# Patient Record
Sex: FEMALE | Race: WHITE
[De-identification: names, ages, dates, MRNs, and addresses within clinical notes are randomized per-mention and may not be internally consistent; named-entity substitution may affect disease eponyms.]

---

## 2023-11-11 ENCOUNTER — RX ONLY (RX ONLY)
Age: 73
End: 2023-11-11

## 2023-11-11 ENCOUNTER — OFFICE (OUTPATIENT)
Dept: URBAN - METROPOLITAN AREA CLINIC 1 | Facility: CLINIC | Age: 73
Setting detail: OPHTHALMOLOGY
End: 2023-11-11
Payer: MEDICARE

## 2023-11-11 DIAGNOSIS — H35.372: ICD-10-CM

## 2023-11-11 DIAGNOSIS — H01.002: ICD-10-CM

## 2023-11-11 DIAGNOSIS — Z96.1: ICD-10-CM

## 2023-11-11 DIAGNOSIS — H01.004: ICD-10-CM

## 2023-11-11 DIAGNOSIS — H01.005: ICD-10-CM

## 2023-11-11 DIAGNOSIS — H01.001: ICD-10-CM

## 2023-11-11 PROCEDURE — 92134 CPTRZ OPH DX IMG PST SGM RTA: CPT | Performed by: OPHTHALMOLOGY

## 2023-11-11 PROCEDURE — 92202 OPSCPY EXTND ON/MAC DRAW: CPT | Performed by: OPHTHALMOLOGY

## 2023-11-11 PROCEDURE — 92004 COMPRE OPH EXAM NEW PT 1/>: CPT | Performed by: OPHTHALMOLOGY

## 2023-11-11 ASSESSMENT — LID EXAM ASSESSMENTS
OD_BLEPHARITIS: RLL RUL
OS_BLEPHARITIS: LLL LUL

## 2023-11-11 ASSESSMENT — REFRACTION_AUTOREFRACTION
OS_CYLINDER: -1.00
OD_CYLINDER: -1.00
OD_SPHERE: +0.25
OD_AXIS: 104
OS_SPHERE: +0.25
OS_AXIS: 093

## 2023-11-11 ASSESSMENT — CONFRONTATIONAL VISUAL FIELD TEST (CVF)
OD_FINDINGS: FULL
OS_FINDINGS: FULL

## 2023-11-11 ASSESSMENT — SPHEQUIV_DERIVED
OD_SPHEQUIV: -0.25
OS_SPHEQUIV: -0.25

## 2023-12-27 ENCOUNTER — APPOINTMENT (OUTPATIENT)
Dept: ORTHOPEDIC SURGERY | Facility: CLINIC | Age: 73
End: 2023-12-27

## 2024-01-05 ENCOUNTER — APPOINTMENT (OUTPATIENT)
Dept: ORTHOPEDIC SURGERY | Facility: CLINIC | Age: 74
End: 2024-01-05
Payer: MEDICARE

## 2024-01-05 VITALS — HEIGHT: 65 IN | WEIGHT: 178 LBS | BODY MASS INDEX: 29.66 KG/M2

## 2024-01-05 DIAGNOSIS — M51.36 OTHER INTERVERTEBRAL DISC DEGENERATION, LUMBAR REGION: ICD-10-CM

## 2024-01-05 DIAGNOSIS — Z98.1 OTHER INTERVERTEBRAL DISC DEGENERATION, LUMBAR REGION: ICD-10-CM

## 2024-01-05 DIAGNOSIS — I10 ESSENTIAL (PRIMARY) HYPERTENSION: ICD-10-CM

## 2024-01-05 PROCEDURE — 72110 X-RAY EXAM L-2 SPINE 4/>VWS: CPT

## 2024-01-05 PROCEDURE — 99203 OFFICE O/P NEW LOW 30 MIN: CPT

## 2024-01-05 RX ORDER — LEVOTHYROXINE SODIUM 0.11 MG/1
112 TABLET ORAL
Refills: 0 | Status: ACTIVE | COMMUNITY

## 2024-01-05 RX ORDER — AMLODIPINE BESYLATE 2.5 MG/1
2.5 TABLET ORAL
Refills: 0 | Status: ACTIVE | COMMUNITY

## 2024-01-05 RX ORDER — ACETAMINOPHEN 500 MG/1
500 TABLET, COATED ORAL
Refills: 0 | Status: ACTIVE | COMMUNITY

## 2024-01-05 RX ORDER — NAPROXEN SODIUM 220 MG
220 TABLET ORAL
Refills: 0 | Status: ACTIVE | COMMUNITY

## 2024-01-09 ENCOUNTER — APPOINTMENT (OUTPATIENT)
Dept: MRI IMAGING | Facility: CLINIC | Age: 74
End: 2024-01-09

## 2024-01-09 ENCOUNTER — APPOINTMENT (OUTPATIENT)
Dept: ORTHOPEDIC SURGERY | Facility: CLINIC | Age: 74
End: 2024-01-09
Payer: MEDICARE

## 2024-01-09 VITALS — WEIGHT: 178 LBS | BODY MASS INDEX: 29.66 KG/M2 | HEIGHT: 65 IN

## 2024-01-09 DIAGNOSIS — Z85.42 PERSONAL HISTORY OF MALIGNANT NEOPLASM OF OTHER PARTS OF UTERUS: ICD-10-CM

## 2024-01-09 DIAGNOSIS — Z86.39 PERSONAL HISTORY OF OTHER ENDOCRINE, NUTRITIONAL AND METABOLIC DISEASE: ICD-10-CM

## 2024-01-09 DIAGNOSIS — Z87.891 PERSONAL HISTORY OF NICOTINE DEPENDENCE: ICD-10-CM

## 2024-01-09 PROCEDURE — 99214 OFFICE O/P EST MOD 30 MIN: CPT

## 2024-01-09 PROCEDURE — 73564 X-RAY EXAM KNEE 4 OR MORE: CPT | Mod: 50

## 2024-01-10 ENCOUNTER — RESULT REVIEW (OUTPATIENT)
Age: 74
End: 2024-01-10

## 2024-01-10 RX ORDER — HYALURONATE SODIUM 20 MG/2 ML
20 SYRINGE (ML) INTRAARTICULAR
Qty: 6 | Refills: 0 | Status: COMPLETED | COMMUNITY
Start: 2024-01-10 | End: 2024-01-31

## 2024-01-11 NOTE — PHYSICAL EXAM
[Normal Coordination] : normal coordination [Normal DTR UE/LE] : normal DTR UE/LE  [Normal Sensation] : normal sensation [Normal Mood and Affect] : normal mood and affect [Orientated] : orientated [Normal Skin] : normal skin [No Rash] : no rash [No Ulcers] : no ulcers [No Lesions] : no lesions [No obvious lymphadenopathy in areas examined] : no obvious lymphadenopathy in areas examined [NL (90)] : forward flexion 90 degrees [NL (30)] : right lateral rotation 30 degrees [NL (45)] : extension 45 degrees [NL (40)] : right lateral bending 40 degrees [Extension] : extension [Bending to left] : bending to left [Bending to right] : bending to right [5___] : right extensor hallicus longus 5[unfilled]/5 [] : non-antalgic [FreeTextEntry3] : Well healed lumbar incision

## 2024-01-11 NOTE — HISTORY OF PRESENT ILLNESS
[3] : 3 [0] : 0 [Localized] : localized [Sharp] : sharp [Tightness] : tightness [Rest] : rest [Ice] : ice [Walking] : walking [Retired] : Work status: retired [de-identified] : 01/05/2024: Patient presents for initial evaluation. 3 years ago in [place] she had spinal fusion due to extreme left sided sciatica. She had excellent resolution of her preoperative symptoms. She reports daily muscle spasm in the left leg. Recently she has had a return in lower back pain. She takes Aleve and Tylenol with some relief.   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

## 2024-01-11 NOTE — ASSESSMENT
[FreeTextEntry1] : 73-year-old female with prior L4-5 PSF/TLIF and sacroiliitis. She will begin a course of lumbar PT. Medial massage may be beneficial for her. The patient is prescribed Meloxicam. If no improvement, a lumbar MRI will be obtained to evaluate for adjacent segment stenosis. Follow up 1 month.   Omid LEBLANC, attest that this documentation has been prepared under the direction and in the presence of provider Efra Solares MD.  Prior to appointment and during encounter with patient extensive medical records were reviewed including but not limited to, hospital records, out patient records, imaging results, and lab data. During this appointment the patient was examined, diagnoses were discussed and explained in a face to face manner. In addition extensive time was spent reviewing aforementioned diagnostic studies. Counseling including abnormal image results, differential diagnoses, treatment options, risk and benefits, lifestyle changes, current condition, and current medications was performed. Patient's comments, questions, and concerns were address and patient verbalized understanding. Based on this patient's presentation at our office, which is an orthopedic spine surgeon's office, this patient inherently / intrinsically has a risk, however minute, of developing issues such as Cauda equina syndrome, bowel and bladder changes, or progression of motor or neurological deficits such as paralysis which may be permanent.

## 2024-01-11 NOTE — DATA REVIEWED
[FreeTextEntry1] : [facility] RT SEBASTIEN L4-5 PSF/TLIF with all hardware in good position without signs of loosening L5-s1 advanced degene L3-4 retolisthesis with moderate to advanced degeneration Multilevel DDD

## 2024-01-19 ENCOUNTER — OFFICE (OUTPATIENT)
Dept: URBAN - METROPOLITAN AREA CLINIC 88 | Facility: CLINIC | Age: 74
Setting detail: OPHTHALMOLOGY
End: 2024-01-19
Payer: MEDICARE

## 2024-01-19 DIAGNOSIS — H35.372: ICD-10-CM

## 2024-01-19 DIAGNOSIS — H35.371: ICD-10-CM

## 2024-01-19 PROCEDURE — 99213 OFFICE O/P EST LOW 20 MIN: CPT | Performed by: OPHTHALMOLOGY

## 2024-01-19 PROCEDURE — 92134 CPTRZ OPH DX IMG PST SGM RTA: CPT | Performed by: OPHTHALMOLOGY

## 2024-01-19 ASSESSMENT — REFRACTION_AUTOREFRACTION
OS_CYLINDER: -1.00
OS_SPHERE: +0.25
OD_AXIS: 104
OD_CYLINDER: -1.00
OD_SPHERE: +0.25
OS_AXIS: 093

## 2024-01-19 ASSESSMENT — CONFRONTATIONAL VISUAL FIELD TEST (CVF)
OD_FINDINGS: FULL
OS_FINDINGS: FULL

## 2024-01-19 ASSESSMENT — SPHEQUIV_DERIVED
OD_SPHEQUIV: -0.25
OS_SPHEQUIV: -0.25

## 2024-01-19 ASSESSMENT — LID EXAM ASSESSMENTS
OS_BLEPHARITIS: LLL LUL
OD_BLEPHARITIS: RLL RUL

## 2024-02-06 ENCOUNTER — APPOINTMENT (OUTPATIENT)
Dept: ORTHOPEDIC SURGERY | Facility: CLINIC | Age: 74
End: 2024-02-06
Payer: MEDICARE

## 2024-02-06 VITALS — WEIGHT: 178 LBS | HEIGHT: 65 IN | BODY MASS INDEX: 29.66 KG/M2

## 2024-02-06 DIAGNOSIS — Z78.9 OTHER SPECIFIED HEALTH STATUS: ICD-10-CM

## 2024-02-06 PROCEDURE — 99212 OFFICE O/P EST SF 10 MIN: CPT | Mod: 25

## 2024-02-06 PROCEDURE — 20611 DRAIN/INJ JOINT/BURSA W/US: CPT | Mod: 50

## 2024-02-06 NOTE — DISCUSSION/SUMMARY
[de-identified] : General Dx Discussion The patient was advised of the diagnosis. The natural history of the pathology was explained in full to the patient in layman's terms. All questions were answered. The risks and benefits of surgical and non-surgical treatment alternatives were explained in full to the patient.  Case Discussed. Euflexxa tolerated well.  f/u 1 week to continue series.   Entered by Tara RUBIO acting as a scribe. Instructions: Dr. Ag- The documentation recorded by the scribe accurately reflects the service I personally performed and the decisions made by me.

## 2024-02-06 NOTE — PHYSICAL EXAM
[Bilateral] : knee bilaterally [NL (0)] : extension 0 degrees [5___] : hamstring 5[unfilled]/5 [] : no tenderness [TWNoteComboBox7] : flexion 110 degrees

## 2024-02-06 NOTE — HISTORY OF PRESENT ILLNESS
[2] : 2 [Localized] : localized [de-identified] : Here for f/u knees for Euflexxa injections.  [] : no [FreeTextEntry1] : knees [FreeTextEntry9] : HEP HEP [de-identified] : coming down the stairs

## 2024-02-07 ENCOUNTER — APPOINTMENT (OUTPATIENT)
Dept: ORTHOPEDIC SURGERY | Facility: CLINIC | Age: 74
End: 2024-02-07

## 2024-02-13 ENCOUNTER — APPOINTMENT (OUTPATIENT)
Dept: ORTHOPEDIC SURGERY | Facility: CLINIC | Age: 74
End: 2024-02-13

## 2024-02-20 ENCOUNTER — APPOINTMENT (OUTPATIENT)
Dept: ORTHOPEDIC SURGERY | Facility: CLINIC | Age: 74
End: 2024-02-20
Payer: MEDICARE

## 2024-02-20 VITALS — BODY MASS INDEX: 29.66 KG/M2 | HEIGHT: 65 IN | WEIGHT: 178 LBS

## 2024-02-20 PROCEDURE — 20611 DRAIN/INJ JOINT/BURSA W/US: CPT | Mod: 50

## 2024-02-20 NOTE — DISCUSSION/SUMMARY
[de-identified] : General Dx Discussion The patient was advised of the diagnosis. The natural history of the pathology was explained in full to the patient in layman's terms. All questions were answered. The risks and benefits of surgical and non-surgical treatment alternatives were explained in full to the patient.  Case Discussed. Euflexxa tolerated well.  f/u 1 week to continue series.   Entered by Kelsey RUBIO acting as a scribe. Instructions: Dr. Ag- The documentation recorded by the scribe accurately reflects the service I personally performed and the decisions made by me.

## 2024-02-20 NOTE — PHYSICAL EXAM
[Bilateral] : knee bilaterally [NL (0)] : extension 0 degrees [5___] : hamstring 5[unfilled]/5 [] : patient ambulates without assistive device [TWNoteComboBox7] : flexion 110 degrees

## 2024-02-20 NOTE — HISTORY OF PRESENT ILLNESS
[0] : 0 [Localized] : localized [Injection therapy] : injection therapy [Euflexxa] : Euflexxa [de-identified] : Here for bilateral knee Euflexxa injections. [] : no [FreeTextEntry1] : bilat knees [de-identified] : 2-6-24 [de-identified] : knees

## 2024-02-27 ENCOUNTER — APPOINTMENT (OUTPATIENT)
Dept: ORTHOPEDIC SURGERY | Facility: CLINIC | Age: 74
End: 2024-02-27
Payer: MEDICARE

## 2024-02-27 VITALS — BODY MASS INDEX: 29.66 KG/M2 | HEIGHT: 65 IN | WEIGHT: 178 LBS

## 2024-02-27 DIAGNOSIS — M17.12 UNILATERAL PRIMARY OSTEOARTHRITIS, LEFT KNEE: ICD-10-CM

## 2024-02-27 DIAGNOSIS — M17.11 UNILATERAL PRIMARY OSTEOARTHRITIS, RIGHT KNEE: ICD-10-CM

## 2024-02-27 PROCEDURE — 20611 DRAIN/INJ JOINT/BURSA W/US: CPT | Mod: 50

## 2024-02-27 PROCEDURE — 99024 POSTOP FOLLOW-UP VISIT: CPT

## 2024-02-27 NOTE — PROCEDURE
[Large Joint Injection] : Large joint injection [Bilateral] : bilaterally of the [Pain] : pain [Knee] : knee [Inflammation] : inflammation [X-ray evidence of Osteoarthritis on this or prior visit] : x-ray evidence of Osteoarthritis on this or prior visit [Alcohol] : alcohol [Betadine] : betadine [Ethyl Chloride sprayed topically] : ethyl chloride sprayed topically [Sterile technique used] : sterile technique used [Euflexxa(20mg)] : 20mg of Euflexxa [#3] : series #3 [] : Patient tolerated procedure well [Call if redness, pain or fever occur] : call if redness, pain or fever occur [Previous OTC use and PT nontherapeutic] : patient has tried OTC's including aspirin, Ibuprofen, Aleve, etc or prescription NSAIDS, and/or exercises at home and/or physical therapy without satisfactory response [Apply ice for 15min out of every hour for the next 12-24 hours as tolerated] : apply ice for 15 minutes out of every hour for the next 12-24 hours as tolerated [Patient had decreased mobility in the joint] : patient had decreased mobility in the joint [Risks, benefits, alternatives discussed / Verbal consent obtained] : the risks benefits, and alternatives have been discussed, and verbal consent was obtained [Prior failure or difficult injection] : prior failure or difficult injection [Altered anatomic landmarks d/t erosive arthritis] : altered anatomic landmarks d/t erosive arthritis [All ultrasound images have been permanently captured and stored accordingly in our picture archiving and communication system] : All ultrasound images have been permanently captured and stored accordingly in our picture archiving and communication system

## 2024-02-27 NOTE — HISTORY OF PRESENT ILLNESS
[2] : 2 [Localized] : localized [Nothing helps with pain getting better] : Nothing helps with pain getting better [3] : 3 [Euflexxa] : Euflexxa [de-identified] : Here for bilateral knee Euflexxa injections. [] : no [FreeTextEntry1] : knees [de-identified] : knees [de-identified] : 2-20-24

## 2024-02-27 NOTE — PHYSICAL EXAM
[Bilateral] : knee bilaterally [NL (0)] : extension 0 degrees [5___] : hamstring 5[unfilled]/5 [] : no erythema [TWNoteComboBox7] : flexion 110 degrees

## 2024-03-10 ENCOUNTER — RX RENEWAL (OUTPATIENT)
Age: 74
End: 2024-03-10

## 2024-03-26 ENCOUNTER — OFFICE (OUTPATIENT)
Dept: URBAN - METROPOLITAN AREA CLINIC 1 | Facility: CLINIC | Age: 74
Setting detail: OPHTHALMOLOGY
End: 2024-03-26
Payer: MEDICARE

## 2024-03-26 DIAGNOSIS — H01.002: ICD-10-CM

## 2024-03-26 DIAGNOSIS — Z96.1: ICD-10-CM

## 2024-03-26 DIAGNOSIS — H35.372: ICD-10-CM

## 2024-03-26 DIAGNOSIS — H01.001: ICD-10-CM

## 2024-03-26 DIAGNOSIS — H35.371: ICD-10-CM

## 2024-03-26 DIAGNOSIS — H01.005: ICD-10-CM

## 2024-03-26 DIAGNOSIS — H01.004: ICD-10-CM

## 2024-03-26 DIAGNOSIS — H52.4: ICD-10-CM

## 2024-03-26 PROCEDURE — 92134 CPTRZ OPH DX IMG PST SGM RTA: CPT

## 2024-03-26 PROCEDURE — 99213 OFFICE O/P EST LOW 20 MIN: CPT

## 2024-03-26 PROCEDURE — 92015 DETERMINE REFRACTIVE STATE: CPT

## 2024-03-26 ASSESSMENT — REFRACTION_MANIFEST
OS_SPHERE: PLANO
OS_AXIS: 085
OD_SPHERE: PLANO
OD_AXIS: 105
OS_VA1: 20/20-1
OD_CYLINDER: -0.75
OD_VA1: 20/20-1
OS_ADD: +2.50
OD_ADD: +2.50
OS_CYLINDER: -0.75

## 2024-03-26 ASSESSMENT — REFRACTION_CURRENTRX
OD_SPHERE: +0.25
OS_ADD: +2.50
OD_AXIS: 135
OD_OVR_VA: 20/
OS_SPHERE: -0.25
OD_CYLINDER: -0.50
OS_OVR_VA: 20/
OD_ADD: +2.50
OS_VPRISM_DIRECTION: PROGS
OS_AXIS: 058
OD_VPRISM_DIRECTION: PROGS
OS_CYLINDER: -0.50

## 2024-03-26 ASSESSMENT — LID EXAM ASSESSMENTS
OD_BLEPHARITIS: RLL RUL
OS_BLEPHARITIS: LLL LUL

## 2024-03-29 ENCOUNTER — OFFICE (OUTPATIENT)
Dept: URBAN - METROPOLITAN AREA CLINIC 88 | Facility: CLINIC | Age: 74
Setting detail: OPHTHALMOLOGY
End: 2024-03-29
Payer: MEDICARE

## 2024-03-29 DIAGNOSIS — H35.371: ICD-10-CM

## 2024-03-29 DIAGNOSIS — H35.372: ICD-10-CM

## 2024-03-29 PROBLEM — H52.7 REFRACTIVE ERROR: Status: ACTIVE | Noted: 2024-03-26

## 2024-03-29 PROCEDURE — 92134 CPTRZ OPH DX IMG PST SGM RTA: CPT | Performed by: OPHTHALMOLOGY

## 2024-03-29 PROCEDURE — 99213 OFFICE O/P EST LOW 20 MIN: CPT | Performed by: OPHTHALMOLOGY

## 2024-03-29 ASSESSMENT — LID EXAM ASSESSMENTS
OS_BLEPHARITIS: LLL LUL
OD_BLEPHARITIS: RLL RUL

## 2024-04-02 ENCOUNTER — APPOINTMENT (OUTPATIENT)
Dept: ORTHOPEDIC SURGERY | Facility: CLINIC | Age: 74
End: 2024-04-02

## 2024-04-19 ENCOUNTER — APPOINTMENT (OUTPATIENT)
Dept: ORTHOPEDIC SURGERY | Facility: CLINIC | Age: 74
End: 2024-04-19
Payer: MEDICARE

## 2024-04-19 VITALS — HEIGHT: 65 IN | WEIGHT: 178 LBS | BODY MASS INDEX: 29.66 KG/M2

## 2024-04-19 DIAGNOSIS — Z98.1 ARTHRODESIS STATUS: ICD-10-CM

## 2024-04-19 DIAGNOSIS — M46.1 SACROILIITIS, NOT ELSEWHERE CLASSIFIED: ICD-10-CM

## 2024-04-19 PROCEDURE — 99214 OFFICE O/P EST MOD 30 MIN: CPT

## 2024-04-21 PROBLEM — Z98.1 S/P LUMBAR FUSION: Status: ACTIVE | Noted: 2024-01-05

## 2024-04-21 PROBLEM — M46.1 SACROILIITIS: Status: ACTIVE | Noted: 2024-01-05

## 2024-04-21 NOTE — HISTORY OF PRESENT ILLNESS
[de-identified] : 04/19/2024: Patient presenting today for a FUV. Meloxicam caused GI distress, only treated with a few times. C/o pain in thoracic and lumbar spine. She states mild BL LE pains. Cramping in bl legs. Pain radiating to BL buttock to posterior thigh. She reports improvement in BL toe numbness.  Being seen by Dr. Ag for knee.   01/05/2024: Patient presents for initial evaluation. 3 years ago in [place] she had spinal fusion due to extreme left sided sciatica. She had excellent resolution of her preoperative symptoms. She reports daily muscle spasm in the left leg. Recently she has had a return in lower back pain. She takes Aleve and Tylenol with some relief.   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

## 2024-04-21 NOTE — PHYSICAL EXAM
[] : patient ambulates with assistive device [FreeTextEntry3] : Well healed lumbar incision  [de-identified] : Mild numbness on dorsal aspect on LT foot.  [TWNoteComboBox7] : forward flexion 45 degrees [de-identified] : extension 10 degrees

## 2024-04-21 NOTE — DISCUSSION/SUMMARY
[de-identified] : 73-year-old female with prior L4-5 PSF/TLIF and sacroiliitis. Lumbar MRI reviewed with pt today.  Patient was educated and informed on their condition along with the expected outcomes. I am referring the patient to Dr. Kingsley to discuss L3-4 NADINE. Discussed possible interventional spine injections vs surgical decompression dependent upon her response to conservative treatments.   F/U in 2 months.   Prior to appointment and during encounter with patient extensive medical records were reviewed including but not limited to, hospital records, out patient records, imaging results, and lab data. During this appointment the patient was examined, diagnoses were discussed and explained in a face to face manner. In addition extensive time was spent reviewing aforementioned diagnostic studies. Counseling including abnormal image results, differential diagnoses, treatment options, risk and benefits, lifestyle changes, current condition, and current medications was performed. Patient's comments, questions, and concerns were address and patient verbalized understanding. Based on this patient's presentation at our office, which is an orthopedic spine surgeon's office, this patient inherently / intrinsically has a risk, however minute, of developing issues such as Cauda equina syndrome, bowel and bladder changes, or progression of motor or neurological deficits such as paralysis which may be permanent.   I, Mahi Jauregui, attest that this documentation has been prepared under the direction and in the presence of provider Efra Solares MD.

## 2024-04-21 NOTE — DATA REVIEWED
[FreeTextEntry1] : On my interpretation of these images from [facility] on 1/10/24. I have additionally reviewed the radiologist report. MRI images were reviewed on today's visit.  L5-S1: adv DDD and mod BL lateral recess stenosis, severe rt mod-severe LT FS.  L4-5: s/p PSF TLIF LAMI L3-4: adv disc degeneration, retrolisthesis and severe BL lateral recess and FS.  L2-3: mod DDD with mod left severe RT FS.  L1-2: mild-mod DDD, minimal stenosis.  T12-L1: mild-mod DDD, minimal stenosis.    [facility] RT SEBASTIEN L4-5 PSF/TLIF with all hardware in good position without signs of loosening L5-s1 advanced degene L3-4 retolisthesis with moderate to advanced degeneration Multilevel DDD

## 2024-04-24 RX ORDER — MELOXICAM 15 MG/1
15 TABLET ORAL DAILY
Qty: 30 | Refills: 1 | Status: ACTIVE | COMMUNITY
Start: 2024-01-05 | End: 1900-01-01

## 2024-05-10 ENCOUNTER — OFFICE (OUTPATIENT)
Dept: URBAN - METROPOLITAN AREA CLINIC 88 | Facility: CLINIC | Age: 74
Setting detail: OPHTHALMOLOGY
End: 2024-05-10
Payer: MEDICARE

## 2024-05-10 DIAGNOSIS — H52.7: ICD-10-CM

## 2024-05-10 DIAGNOSIS — H35.371: ICD-10-CM

## 2024-05-10 DIAGNOSIS — H35.372: ICD-10-CM

## 2024-05-10 PROCEDURE — 92134 CPTRZ OPH DX IMG PST SGM RTA: CPT | Performed by: OPHTHALMOLOGY

## 2024-05-10 PROCEDURE — 92202 OPSCPY EXTND ON/MAC DRAW: CPT | Performed by: OPHTHALMOLOGY

## 2024-05-10 PROCEDURE — 92012 INTRM OPH EXAM EST PATIENT: CPT | Performed by: OPHTHALMOLOGY

## 2024-05-10 ASSESSMENT — CONFRONTATIONAL VISUAL FIELD TEST (CVF)
OD_FINDINGS: FULL
OS_FINDINGS: FULL

## 2024-05-10 ASSESSMENT — LID EXAM ASSESSMENTS
OS_BLEPHARITIS: LLL LUL
OD_BLEPHARITIS: RLL RUL

## 2024-05-13 ENCOUNTER — APPOINTMENT (OUTPATIENT)
Dept: PAIN MANAGEMENT | Facility: CLINIC | Age: 74
End: 2024-05-13

## 2024-05-15 ENCOUNTER — OFFICE (OUTPATIENT)
Dept: URBAN - METROPOLITAN AREA CLINIC 1 | Facility: CLINIC | Age: 74
Setting detail: OPHTHALMOLOGY
End: 2024-05-15
Payer: MEDICARE

## 2024-05-15 DIAGNOSIS — H52.7: ICD-10-CM

## 2024-05-15 PROCEDURE — RX/CHECK RX/CHECK

## 2024-05-15 ASSESSMENT — LID EXAM ASSESSMENTS
OD_BLEPHARITIS: RLL RUL
OS_BLEPHARITIS: LLL LUL

## 2024-05-15 ASSESSMENT — CONFRONTATIONAL VISUAL FIELD TEST (CVF)
OD_FINDINGS: FULL
OS_FINDINGS: FULL

## 2024-06-10 ENCOUNTER — APPOINTMENT (OUTPATIENT)
Dept: PAIN MANAGEMENT | Facility: CLINIC | Age: 74
End: 2024-06-10
Payer: MEDICARE

## 2024-06-10 VITALS — BODY MASS INDEX: 30.32 KG/M2 | WEIGHT: 182 LBS | HEIGHT: 65 IN

## 2024-06-10 DIAGNOSIS — M25.512 PAIN IN RIGHT SHOULDER: ICD-10-CM

## 2024-06-10 DIAGNOSIS — M25.511 PAIN IN RIGHT SHOULDER: ICD-10-CM

## 2024-06-10 PROCEDURE — 99203 OFFICE O/P NEW LOW 30 MIN: CPT

## 2024-06-10 NOTE — REASON FOR VISIT
[Initial Consultation] : an initial pain management consultation [FreeTextEntry2] : Bilateral shoulder pain

## 2024-06-10 NOTE — ASSESSMENT
[FreeTextEntry1] : A discussion regarding available pain management treatment options occurred with the patient.  These included interventional, rehabilitative, pharmacological, and alternative modalities. We will proceed with the following:    Interventional treatment options:   - None indicated at present time   - see additional instructions below    Rehabilitative options:   - Would defer to orthopedic surgery pending eval - participation in active HEP was discussed and encouraged as tolerated  Medication based treatment options:   -Continue Tylenol 500-1000 mg up to TID as needed -Poor candidate for oral NSAIDs as per patient given prior hypertensive effect - see additional instructions below    Complementary treatment options:   - Weight management and lifestyle modifications discussed   Additional treatment recommendations as follows:   - Referral provided to orthopedic surgery (Dr. Guido) regarding bilateral shoulder pain - Patient can follow-up on as-needed basis with any complaints regarding her cervical or lumbar spine pain  The documentation recorded by the scribe, in my presence, accurately reflects the service I personally performed and the decisions made by me with my edits as appropriate.   I, Dejuan Chamorro acting as scribe, attest that this documentation has been prepared under the direction and in the presence of Provider Christiano Sharpe DO.

## 2024-06-10 NOTE — PHYSICAL EXAM
[Bilateral] : shoulder bilaterally [de-identified] : Constitutional:   - No acute distress   - Well developed; well nourished    Neurological:   - normal mood and affect   - alert and oriented x 3     Cardiovascular:   - grossly normal   Cervical Spine Exam:   Inspection:   erythema (-)   ecchymosis (-)   rashes (-)    Palpation:                                                    Cervical paraspinal tenderness:         R (-); L(-)  Upper trapezius tenderness:              R (-); L (-)  Rhomboids tenderness:                      R (-); L (-)  Occipital Ridge:                                   R (-); L (-)  Supraspinatus tenderness:                 R (-); L (-)   ROM: WNL; stiffness throughout ROM testing No pain throughout range of motion testing  Strength Testing:              Deltoid                           R (5/5); L (5/5)  Biceps:                          R (5/5); L (5/5)  Triceps:                         R (5/5); L (5/5)  Finger Abductors:         R (5/5); L (5/5)  Grasp:                           R (5/5); L (5/5)   Special Testing:  Spurling Test:                  R (-); L (-)  Facet load test:               R (-); L (-)   Neuro:  SILT throughout right upper extremity  SILT throughout left upper extremity   Reflexes:  Biceps   -           R (2+); L (2+)  Triceps  -           R (2+); L (2+)  Brachioradialis- R (2+); L (2+)     No ankle clonus   [] : ROM is limited secondary to pain [FreeTextEntry9] : Diminished ROM all planes, crepitus throughout ROM Testing

## 2024-06-10 NOTE — HISTORY OF PRESENT ILLNESS
[Neck] : neck [9] : 9 [Dull/Aching] : dull/aching [Constant] : constant [Sleep] : sleep [Rest] : rest [Standing] : standing [Walking] : walking [FreeTextEntry1] : The patient presents for initial evaluation.  Patient was referred by Dr. Solares for previous complaints related to her lumbar spine.  She states at this point this is no longer a concern for her and wishes to address her pain in her bilateral shoulders. The majority of her pain is at the shoulders, and she has minimal neck pain; she denies any radicular pain going past the elbows.  She denies any upper extremity paresthesias, weakness, bladder/bowel dysfunction, or progressive gait abnormalities.  Uses Tylenol as needed for pain.  Subjective weakness: No  Lower extremity paresthesias: No  Bladder/bowel dysfunction: No   Injections: No   Pertinent Surgical History:  1) L4-5 PSF/TLIF  Imagin) MRI Lumbar Spine (1/10/2024) -  Rad    Physician Disclaimer: I have personally reviewed and confirmed all HPI data with the patient.  [] : Patient is currently injured and not playing sports: no [FreeTextEntry7] : shoulders [FreeTextEntry9] : Tylenol [de-identified] : 2020

## 2024-06-24 ENCOUNTER — APPOINTMENT (OUTPATIENT)
Dept: ORTHOPEDIC SURGERY | Facility: CLINIC | Age: 74
End: 2024-06-24
Payer: MEDICARE

## 2024-06-24 VITALS — BODY MASS INDEX: 30.32 KG/M2 | HEIGHT: 65 IN | WEIGHT: 182 LBS

## 2024-06-24 DIAGNOSIS — Z00.00 ENCOUNTER FOR GENERAL ADULT MEDICAL EXAMINATION W/OUT ABNORMAL FINDINGS: ICD-10-CM

## 2024-06-24 DIAGNOSIS — M19.011 PRIMARY OSTEOARTHRITIS, RIGHT SHOULDER: ICD-10-CM

## 2024-06-24 DIAGNOSIS — M19.012 PRIMARY OSTEOARTHRITIS, RIGHT SHOULDER: ICD-10-CM

## 2024-06-24 PROCEDURE — 73030 X-RAY EXAM OF SHOULDER: CPT | Mod: 50

## 2024-06-24 PROCEDURE — 99214 OFFICE O/P EST MOD 30 MIN: CPT

## 2024-06-26 ENCOUNTER — RESULT REVIEW (OUTPATIENT)
Age: 74
End: 2024-06-26

## 2024-06-30 PROBLEM — Z00.00 ENCOUNTER FOR PREVENTIVE HEALTH EXAMINATION: Status: ACTIVE | Noted: 2023-12-28

## 2024-07-08 ENCOUNTER — APPOINTMENT (OUTPATIENT)
Dept: ORTHOPEDIC SURGERY | Facility: CLINIC | Age: 74
End: 2024-07-08
Payer: MEDICARE

## 2024-07-08 DIAGNOSIS — M19.012 PRIMARY OSTEOARTHRITIS, RIGHT SHOULDER: ICD-10-CM

## 2024-07-08 DIAGNOSIS — M19.011 PRIMARY OSTEOARTHRITIS, RIGHT SHOULDER: ICD-10-CM

## 2024-07-08 PROCEDURE — 99214 OFFICE O/P EST MOD 30 MIN: CPT

## 2024-07-24 ENCOUNTER — APPOINTMENT (OUTPATIENT)
Dept: ORTHOPEDIC SURGERY | Facility: CLINIC | Age: 74
End: 2024-07-24
Payer: MEDICARE

## 2024-07-24 VITALS — HEIGHT: 65 IN | BODY MASS INDEX: 30.32 KG/M2 | WEIGHT: 182 LBS

## 2024-07-24 DIAGNOSIS — M46.1 SACROILIITIS, NOT ELSEWHERE CLASSIFIED: ICD-10-CM

## 2024-07-24 DIAGNOSIS — Z98.1 ARTHRODESIS STATUS: ICD-10-CM

## 2024-07-24 PROCEDURE — 72100 X-RAY EXAM L-S SPINE 2/3 VWS: CPT

## 2024-07-24 PROCEDURE — 99214 OFFICE O/P EST MOD 30 MIN: CPT

## 2024-07-24 RX ORDER — METHYLPREDNISOLONE 4 MG/1
4 TABLET ORAL
Qty: 1 | Refills: 0 | Status: ACTIVE | COMMUNITY
Start: 2024-07-24 | End: 1900-01-01

## 2024-07-28 NOTE — PHYSICAL EXAM
[Normal Coordination] : normal coordination [Normal DTR UE/LE] : normal DTR UE/LE  [Normal Sensation] : normal sensation [Normal Mood and Affect] : normal mood and affect [Oriented] : oriented [Normal Skin] : normal skin [No Rash] : no rash [No Ulcers] : no ulcers [No Lesions] : no lesions [No obvious lymphadenopathy in areas examined] : no obvious lymphadenopathy in areas examined [NL (30)] : right lateral rotation 30 degrees [NL (45)] : extension 45 degrees [NL (40)] : right lateral bending 40 degrees [Flexion] : flexion [Extension] : extension [5___] : right extensor hallicus longus 5[unfilled]/5 [] : non-antalgic [FreeTextEntry3] : Well healed lumbar incision  [de-identified] : Mild numbness on dorsal aspect on LT foot.  [TWNoteComboBox7] : forward flexion 45 degrees [de-identified] : extension 10 degrees

## 2024-07-28 NOTE — DISCUSSION/SUMMARY
[de-identified] : 73-year-old female with prior L4-5 PSF/TLIF and sacroiliitis. XRs reviewed & discussed with patient today- RT SEBASTIEN implant, L4-5 PSF/TLIF adv DDD at L5-S1 & L3-4 with retrolisthesis and adv DDD up to lower thoracic spine. Follow up with Dr. Kingsley to discuss L3-4 NADINE. MDP RX'd for symptom control. She may treat with OTC Tylenol if needed. Discussed possible interventional spine injections vs surgical decompression dependent upon her response to conservative treatments.  Patient was provided with a referral for lumbar physical therapy to work on stretching, strengthening and range of motion.   Moving forward I'd like to see as needed.   Prior to appointment and during encounter with patient extensive medical records were reviewed including but not limited to, hospital records, out patient records, imaging results, and lab data. During this appointment the patient was examined, diagnoses were discussed and explained in a face to face manner. In addition extensive time was spent reviewing aforementioned diagnostic studies. Counseling including abnormal image results, differential diagnoses, treatment options, risk and benefits, lifestyle changes, current condition, and current medications was performed. Patient's comments, questions, and concerns were address and patient verbalized understanding. Based on this patient's presentation at our office, which is an orthopedic spine surgeon's office, this patient inherently / intrinsically has a risk, however minute, of developing issues such as Cauda equina syndrome, bowel and bladder changes, or progression of motor or neurological deficits such as paralysis which may be permanent.   I, Mahi Jauregui, attest that this documentation has been prepared under the direction and in the presence of provider Efra Solares MD.

## 2024-07-28 NOTE — DATA REVIEWED
[FreeTextEntry1] : On my interpretations of these images from [facility] house on 07/24/2024: I have independently reviewed and interpreted these images. 2 V lumbar XR- RT SEBASTIEN implant, L4-5 PSF/TLIF adv DDD at L5-S1 & L3-4 with retrolisthesis and adv DDD up to lower thoracic spine.   On my interpretation of these images from [facility] on 1/10/24. I have additionally reviewed the radiologist report. MRI images were reviewed on today's visit.  L5-S1: adv DDD and mod BL lateral recess stenosis, severe rt mod-severe LT FS.  L4-5: s/p PSF TLIF LAMI L3-4: adv disc degeneration, retrolisthesis and severe BL lateral recess and FS.  L2-3: mod DDD with mod left severe RT FS.  L1-2: mild-mod DDD, minimal stenosis.  T12-L1: mild-mod DDD, minimal stenosis.    [facility] RT SEBASTIEN L4-5 PSF/TLIF with all hardware in good position without signs of loosening L5-s1 advanced degene L3-4 retolisthesis with moderate to advanced degeneration Multilevel DDD

## 2024-07-28 NOTE — HISTORY OF PRESENT ILLNESS
[Radiating] : radiating [] : yes [Retired] : Work status: retired [de-identified] : 07/24/2024: Patient presenting today for a FUV. 5 days of LT leg pain, attributed to moving heavy planter. Pt states she felt increased back and LLE pains afterwards. Sleep disturbances due to LLE pains. Similar symptoms to pre-op pains. Treating with OTC Aleve, heat and ice packs. Did not proceed with injection w/ Dr. Cardenas as the pt was not symptomatic at the time. Seen by Dr. Guido and was indicated for shoulder sx, but is treating condition with PT.     04/19/2024: Patient presenting today for a FUV. Meloxicam caused GI distress, only treated with a few times. C/o pain in thoracic and lumbar spine. She states mild BL LE pains. Cramping in bl legs. Pain radiating to BL buttock to posterior thigh. She reports improvement in BL toe numbness.  Being seen by Dr. Ag for knee.   01/05/2024: Patient presents for initial evaluation. 3 years ago in [place] she had spinal fusion due to extreme left sided sciatica. She had excellent resolution of her preoperative symptoms. She reports daily muscle spasm in the left leg. Recently she has had a return in lower back pain. She takes Aleve and Tylenol with some relief.   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

## 2024-07-28 NOTE — DISCUSSION/SUMMARY
[de-identified] : 73-year-old female with prior L4-5 PSF/TLIF and sacroiliitis. XRs reviewed & discussed with patient today- RT SEBASTIEN implant, L4-5 PSF/TLIF adv DDD at L5-S1 & L3-4 with retrolisthesis and adv DDD up to lower thoracic spine. Follow up with Dr. Kingsley to discuss L3-4 NADINE. MDP RX'd for symptom control. She may treat with OTC Tylenol if needed. Discussed possible interventional spine injections vs surgical decompression dependent upon her response to conservative treatments.  Patient was provided with a referral for lumbar physical therapy to work on stretching, strengthening and range of motion.   Moving forward I'd like to see as needed.   Prior to appointment and during encounter with patient extensive medical records were reviewed including but not limited to, hospital records, out patient records, imaging results, and lab data. During this appointment the patient was examined, diagnoses were discussed and explained in a face to face manner. In addition extensive time was spent reviewing aforementioned diagnostic studies. Counseling including abnormal image results, differential diagnoses, treatment options, risk and benefits, lifestyle changes, current condition, and current medications was performed. Patient's comments, questions, and concerns were address and patient verbalized understanding. Based on this patient's presentation at our office, which is an orthopedic spine surgeon's office, this patient inherently / intrinsically has a risk, however minute, of developing issues such as Cauda equina syndrome, bowel and bladder changes, or progression of motor or neurological deficits such as paralysis which may be permanent.   I, Mahi Jauregui, attest that this documentation has been prepared under the direction and in the presence of provider Efra Solares MD.

## 2024-07-28 NOTE — PHYSICAL EXAM
[Normal Coordination] : normal coordination [Normal DTR UE/LE] : normal DTR UE/LE  [Normal Sensation] : normal sensation [Normal Mood and Affect] : normal mood and affect [Oriented] : oriented [Normal Skin] : normal skin [No Rash] : no rash [No Ulcers] : no ulcers [No Lesions] : no lesions [No obvious lymphadenopathy in areas examined] : no obvious lymphadenopathy in areas examined [NL (30)] : right lateral rotation 30 degrees [NL (45)] : extension 45 degrees [NL (40)] : right lateral bending 40 degrees [Flexion] : flexion [Extension] : extension [5___] : right extensor hallicus longus 5[unfilled]/5 [] : non-antalgic [FreeTextEntry3] : Well healed lumbar incision  [de-identified] : Mild numbness on dorsal aspect on LT foot.  [TWNoteComboBox7] : forward flexion 45 degrees [de-identified] : extension 10 degrees

## 2024-07-28 NOTE — DATA REVIEWED
[FreeTextEntry1] : On my interpretations of these images from [facility] on 07/24/2024: I have independently reviewed and interpreted these images. 2 V lumbar XR- RT SEBASTIEN implant, L4-5 PSF/TLIF adv DDD at L5-S1 & L3-4 with retrolisthesis and adv DDD up to lower thoracic spine.   On my interpretation of these images from [facility] on 1/10/24. I have additionally reviewed the radiologist report. MRI images were reviewed on today's visit.  L5-S1: adv DDD and mod BL lateral recess stenosis, severe rt mod-severe LT FS.  L4-5: s/p PSF TLIF LAMI L3-4: adv disc degeneration, retrolisthesis and severe BL lateral recess and FS.  L2-3: mod DDD with mod left severe RT FS.  L1-2: mild-mod DDD, minimal stenosis.  T12-L1: mild-mod DDD, minimal stenosis.    [facility] RT SEBASTIEN L4-5 PSF/TLIF with all hardware in good position without signs of loosening L5-s1 advanced degene L3-4 retolisthesis with moderate to advanced degeneration Multilevel DDD

## 2024-07-28 NOTE — HISTORY OF PRESENT ILLNESS
[Radiating] : radiating [] : yes [Retired] : Work status: retired [de-identified] : 07/24/2024: Patient presenting today for a FUV. 5 days of LT leg pain, attributed to moving heavy planter. Pt states she felt increased back and LLE pains afterwards. Sleep disturbances due to LLE pains. Similar symptoms to pre-op pains. Treating with OTC Aleve, heat and ice packs. Did not proceed with injection w/ Dr. Cardenas as the pt was not symptomatic at the time. Seen by Dr. Guido and was indicated for shoulder sx, but is treating condition with PT.     04/19/2024: Patient presenting today for a FUV. Meloxicam caused GI distress, only treated with a few times. C/o pain in thoracic and lumbar spine. She states mild BL LE pains. Cramping in bl legs. Pain radiating to BL buttock to posterior thigh. She reports improvement in BL toe numbness.  Being seen by Dr. Ag for knee.   01/05/2024: Patient presents for initial evaluation. 3 years ago in [place] she had spinal fusion due to extreme left sided sciatica. She had excellent resolution of her preoperative symptoms. She reports daily muscle spasm in the left leg. Recently she has had a return in lower back pain. She takes Aleve and Tylenol with some relief.   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

## 2024-08-09 ENCOUNTER — RESULT REVIEW (OUTPATIENT)
Age: 74
End: 2024-08-09

## 2024-08-20 ENCOUNTER — APPOINTMENT (OUTPATIENT)
Dept: PAIN MANAGEMENT | Facility: CLINIC | Age: 74
End: 2024-08-20
Payer: MEDICARE

## 2024-08-20 DIAGNOSIS — Z98.1 ARTHRODESIS STATUS: ICD-10-CM

## 2024-08-20 DIAGNOSIS — M25.511 PAIN IN RIGHT SHOULDER: ICD-10-CM

## 2024-08-20 DIAGNOSIS — M51.36 OTHER INTERVERTEBRAL DISC DEGENERATION, LUMBAR REGION: ICD-10-CM

## 2024-08-20 DIAGNOSIS — M25.512 PAIN IN RIGHT SHOULDER: ICD-10-CM

## 2024-08-20 DIAGNOSIS — Z98.1 OTHER INTERVERTEBRAL DISC DEGENERATION, LUMBAR REGION: ICD-10-CM

## 2024-08-20 PROCEDURE — 99213 OFFICE O/P EST LOW 20 MIN: CPT

## 2024-08-20 NOTE — ASSESSMENT
[FreeTextEntry1] : A discussion regarding available pain management treatment options occurred with the patient.  These included interventional, rehabilitative, pharmacological, and alternative modalities. We will proceed with the following:    Interventional treatment options:   - None indicated at present time - Can consider lumbar directed intervention with return of pain - Once again advised patient to discuss treatment options for her bilateral shoulders with orthopedics - see additional instructions below    Rehabilitative options:   - Continue physical therapy for lumbar spine - participation in active HEP was discussed and encouraged as tolerated  Medication based treatment options:   - Continue Tylenol 500-1000 mg up to TID as needed - Poor candidate for oral NSAIDs as per patient given prior hypertensive effect - see additional instructions below    Complementary treatment options:   - Weight management and lifestyle modifications discussed   Additional treatment recommendations as follows: - Follow-up with Dr. Guido as directed regarding her bilateral shoulder pain - Patient can follow-up on as-needed basis with any complaints regarding her cervical or lumbar spine pain

## 2024-08-20 NOTE — HISTORY OF PRESENT ILLNESS
[Sharp] : sharp [Tightness] : tightness [Household chores] : household chores [Lying in bed] : lying in bed [Lower back] : lower back [FreeTextEntry1] : 2024 - Patient presents for follow-up visit.  She was referred once again by Dr. Solares regarding her low back.  She was last seen approximately 2 months ago.  She did not opt to proceed with interventional therapy given her symptoms has largely resolved.  This appears to be the case once again today; she complains of minimal back pain.  Her greatest pain complaint continues to be her bilateral shoulders.  She was indicated for bilateral TSA with Dr. Guido.  She states that she cannot move forward at this time due to family obligations.  2024 - The patient presents for initial evaluation.  Patient was referred by Dr. Solares for previous complaints related to her lumbar spine.  She states at this point this is no longer a concern for her and wishes to address her pain in her bilateral shoulders. The majority of her pain is at the shoulders, and she has minimal neck pain; she denies any radicular pain going past the elbows.  She denies any upper extremity paresthesias, weakness, bladder/bowel dysfunction, or progressive gait abnormalities.  Uses Tylenol as needed for pain.  Injections: No   Pertinent Surgical History:  1) L4-5 PSF/TLIF  Imagin) MRI Lumbar Spine (1/10/2024) - [facility]    Physician Disclaimer: I have personally reviewed and confirmed all HPI data with the patient. [] : Patient is currently injured and not playing sports: no [FreeTextEntry9] : Tylenol [de-identified] : LIFTING  [de-identified] : 2020

## 2024-08-20 NOTE — PHYSICAL EXAM
[de-identified] : Constitutional:   - No acute distress   - Well developed; well nourished    Neurological:   - normal mood and affect   - alert and oriented x 3     Cardiovascular:   - grossly normal

## 2024-09-11 ENCOUNTER — OFFICE (OUTPATIENT)
Dept: URBAN - METROPOLITAN AREA CLINIC 88 | Facility: CLINIC | Age: 74
Setting detail: OPHTHALMOLOGY
End: 2024-09-11
Payer: MEDICARE

## 2024-09-11 DIAGNOSIS — H35.372: ICD-10-CM

## 2024-09-11 DIAGNOSIS — H52.7: ICD-10-CM

## 2024-09-11 PROCEDURE — 92014 COMPRE OPH EXAM EST PT 1/>: CPT | Performed by: OPHTHALMOLOGY

## 2024-09-11 PROCEDURE — 92134 CPTRZ OPH DX IMG PST SGM RTA: CPT | Performed by: OPHTHALMOLOGY

## 2024-09-11 PROCEDURE — 92202 OPSCPY EXTND ON/MAC DRAW: CPT | Performed by: OPHTHALMOLOGY

## 2024-09-11 ASSESSMENT — LID EXAM ASSESSMENTS
OS_BLEPHARITIS: LLL LUL
OD_BLEPHARITIS: RLL RUL

## 2024-09-11 ASSESSMENT — CONFRONTATIONAL VISUAL FIELD TEST (CVF)
OD_FINDINGS: FULL
OS_FINDINGS: FULL

## 2024-09-12 ENCOUNTER — APPOINTMENT (OUTPATIENT)
Dept: ENDOCRINOLOGY | Facility: CLINIC | Age: 74
End: 2024-09-12

## 2024-09-25 ENCOUNTER — OFFICE (OUTPATIENT)
Dept: URBAN - METROPOLITAN AREA CLINIC 12 | Facility: CLINIC | Age: 74
Setting detail: OPHTHALMOLOGY
End: 2024-09-25
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
## 2024-09-25 PROCEDURE — NO SHOW FE NO SHOW FEE: Performed by: STUDENT IN AN ORGANIZED HEALTH CARE EDUCATION/TRAINING PROGRAM

## 2024-10-02 ENCOUNTER — OFFICE (OUTPATIENT)
Dept: URBAN - METROPOLITAN AREA CLINIC 88 | Facility: CLINIC | Age: 74
Setting detail: OPHTHALMOLOGY
End: 2024-10-02
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
## 2024-10-02 PROCEDURE — NO SHOW FE NO SHOW FEE: Performed by: OPHTHALMOLOGY

## 2024-11-04 ENCOUNTER — OFFICE (OUTPATIENT)
Dept: URBAN - METROPOLITAN AREA CLINIC 105 | Facility: CLINIC | Age: 74
Setting detail: OPHTHALMOLOGY
End: 2024-11-04
Payer: MEDICARE

## 2024-11-04 DIAGNOSIS — H16.223: ICD-10-CM

## 2024-11-04 PROCEDURE — 92012 INTRM OPH EXAM EST PATIENT: CPT | Performed by: OPHTHALMOLOGY

## 2024-11-04 ASSESSMENT — KERATOMETRY
OD_K1POWER_DIOPTERS: 39.50
OS_AXISANGLE_DEGREES: 090
OS_K1POWER_DIOPTERS: 39.75
OD_AXISANGLE_DEGREES: 053
OS_K2POWER_DIOPTERS: 39.75
OD_K2POWER_DIOPTERS: 39.75

## 2024-11-04 ASSESSMENT — REFRACTION_AUTOREFRACTION
OD_CYLINDER: -0.25
OD_AXIS: 111
OD_SPHERE: -0.25
OS_SPHERE: PLANO
OS_AXIS: 086
OS_CYLINDER: -0.75

## 2024-11-04 ASSESSMENT — REFRACTION_CURRENTRX
OD_CYLINDER: -0.25
OS_ADD: +2.50
OS_VPRISM_DIRECTION: PROGS
OS_AXIS: 067
OS_CYLINDER: -0.50
OD_VPRISM_DIRECTION: PROGS
OS_OVR_VA: 20/
OD_AXIS: 119
OD_SPHERE: PLANO
OS_SPHERE: -0.25
OD_OVR_VA: 20/
OD_ADD: +2.50

## 2024-11-04 ASSESSMENT — CONFRONTATIONAL VISUAL FIELD TEST (CVF)
OS_FINDINGS: FULL
OD_FINDINGS: FULL

## 2024-11-04 ASSESSMENT — LID EXAM ASSESSMENTS
OD_BLEPHARITIS: RLL RUL
OS_BLEPHARITIS: LLL LUL

## 2024-11-04 ASSESSMENT — VISUAL ACUITY
OS_BCVA: 20/25-
OD_BCVA: 20/30-2

## 2024-11-04 ASSESSMENT — TONOMETRY
OS_IOP_MMHG: 15
OD_IOP_MMHG: 15

## 2024-11-04 ASSESSMENT — SUPERFICIAL PUNCTATE KERATITIS (SPK)
OD_SPK: T
OS_SPK: 2+

## 2024-11-14 NOTE — H&P PST ADULT - HISTORY OF PRESENT ILLNESS
HPI: 74F PMH HTN; High Cholesterol; Hypothyroidism; Thyroidectomy; Hysterectomy; Lumbosacral spinal surgery with complaints of CASSIDY and a heavy sensation on the left side of her when she tries to exert herself. CT calcium score performed 9/3/24 = 264 (LM: 0, , Cx 75; RCA 0). She underwent SPECT Myocardial Perfusion Study in which she exercised for 6 mins achieving 7 METs, chest pain/ dyspnea/ fatigue occurred EKG changes revealed 1.5mm up sloping ST depression (mildly positive) in inferolateral leads during stress only. Myocardial perfusion imaging revealed small to moderate sized, mild to moderate severity, inferior and inferolateral completely reversible defect suggestive of ischemia. EF 65%. Patient presents to [facility] for elective LHC due to abnormal NST and to further evaluate for coronary artery disease.       Symptoms:        Angina (Class): II        Ischemic Symptoms: chest pain, CASSIDY    Heart Failure:        Systolic/Diastolic/Combined: n/a       NYHA Class (within 2 weeks): n/a    Assessment of LVEF (Must be within 6 months):       EF: 60%       Assessed by: TTE        Date: 4/2024    Prior Cardiac Interventions:       PCI's (Date, Stents, Vessels): n/a       CABG (Date, Grafts): n/a    Noninvasive Testing:   Stress Test: Date: 10/30/24       Protocol: Nuclear Medicine SPECT Myocardial Perfusion       Duration of Exercise: 6.0 mins achieving 7.0 METs       Symptoms: dyspnea, fatigue and chest pain       EKG Changes: 1.5mm upsloping ST depression (mildly positive) in inferolateral leads during stress only       Myocardial Imaging: small to moderate sized, mild to moderate severity, inferior and inferolateral completely reversible defect suggestive of ischemia. EF 65%       Risk Assessment (Low, Medium, High):     Echo (Date, Findings): TTE 4/10/2024: LVEF 60%; no RWMA; trace MR; mild TR; normal pericardium.     CT calcium score performed 9/3/24 = 264 (LM: 0, , Cx 75; RCA 0).    Antianginal Therapies:        Beta Blockers:         Calcium Channel Blockers:        Long Acting Nitrates:        Ranexa:     Associated Risk Factors:        Cerebrovascular Disease: N/A       Chronic Lung Disease: N/A       Peripheral Arterial Disease: N/A       Chronic Kidney Disease (if yes, what is GFR): N/A       Uncontrolled Diabetes (if yes, what is HgbA1C or FBS): N/A       Poorly Controlled Hypertension (if yes, what is SBP): N/A       Morbid Obesity (if yes, what is BMI): N/A       History of Recent Ventricular Arrhythmia: N/A       Inability to Ambulate Safely: N/A       Need for Therapeutic Anticoagulation: N/A       Antiplatelet or Contrast Allergy: N/A     HPI: 74F PMH HTN; High Cholesterol; Hypothyroidism;  Thyroid Ca s/p Thyroidectomy (15 years ago); Uterine Ca s/p Hysterectomy (35 years ago); Spinal stenosis s/p Lumbosacral spinal surgery 2020) with complaints of CASSIDY and a heavy sensation on the left side of her when she tries to exert herself. CT calcium score performed 9/3/24 = 264 (LM: 0, , Cx 75; RCA 0). She underwent SPECT Myocardial Perfusion Study in which she exercised for 6 mins achieving 7 METs, chest pain/ dyspnea/ fatigue occurred EKG changes revealed 1.5mm up sloping ST depression (mildly positive) in inferolateral leads during stress only. Myocardial perfusion imaging revealed small to moderate sized, mild to moderate severity, inferior and inferolateral completely reversible defect suggestive of ischemia. EF 65%. Patient presents to [facility] for elective LHC due to abnormal NST and to further evaluate for coronary artery disease.       Symptoms:        Angina (Class): II        Ischemic Symptoms: chest pain, CASSIDY    Heart Failure:        Systolic/Diastolic/Combined: n/a       NYHA Class (within 2 weeks): n/a    Assessment of LVEF (Must be within 6 months):       EF: 60%       Assessed by: TTE        Date: 4/2024    Prior Cardiac Interventions:       PCI's (Date, Stents, Vessels): n/a       CABG (Date, Grafts): n/a    Noninvasive Testing:   Stress Test: Date: 10/30/24       Protocol: Nuclear Medicine SPECT Myocardial Perfusion       Duration of Exercise: 6.0 mins achieving 7.0 METs       Symptoms: dyspnea, fatigue and chest pain       EKG Changes: 1.5mm upsloping ST depression (mildly positive) in inferolateral leads during stress only       Myocardial Imaging: small to moderate sized, mild to moderate severity, inferior and inferolateral completely reversible defect suggestive of ischemia. EF 65%       Risk Assessment (Low, Medium, High):     Echo (Date, Findings): TTE 4/10/2024: LVEF 60%; no RWMA; trace MR; mild TR; normal pericardium.     CT calcium score performed 9/3/24 = 264 (LM: 0, , Cx 75; RCA 0).    Antianginal Therapies:        Beta Blockers:         Calcium Channel Blockers:        Long Acting Nitrates:        Ranexa:     Associated Risk Factors:        Cerebrovascular Disease: N/A       Chronic Lung Disease: N/A       Peripheral Arterial Disease: N/A       Chronic Kidney Disease (if yes, what is GFR): N/A       Uncontrolled Diabetes (if yes, what is HgbA1C or FBS): N/A       Poorly Controlled Hypertension (if yes, what is SBP): N/A       Morbid Obesity (if yes, what is BMI): N/A       History of Recent Ventricular Arrhythmia: N/A       Inability to Ambulate Safely: N/A       Need for Therapeutic Anticoagulation: N/A       Antiplatelet or Contrast Allergy: N/A    LABS:                        14.1   7.11  )-----------( 197      ( 15 Nov 2024 14:35 )             43.7     11-15    142  |  103  |  17.2  ----------------------------<  89  4.7   |  24.0  |  0.63    Ca    9.2      15 Nov 2024 14:35  Mg     2.0     11-15

## 2024-11-14 NOTE — H&P PST ADULT - ASSESSMENT
Impression:  74F PMH HTN; High Cholesterol; Hypothyroidism; Thyroidectomy; Hysterectomy; Lumbosacral spinal surgery, high total calcium score  264 (LM: 0, , Cx 75; RCA 0)  with complaints of CASSIDY and a heavy sensation on the left side of her when she tries to exert herself.+Abnormal SPECT Myocardial Perfusion Study with EKG changes revealed 1.5mm up sloping ST depression in inferolateral leads during stress only. Myocardial perfusion imaging revealed small to moderate sized, mild to moderate severity, inferior and inferolateral completely reversible defect suggestive of ischemia. She presents to [facility] for elective LHC today.     Risk Assessments:  ASA:  Mallampati:  GFR:   Cr:  BRA:    Plan:  -plan for LHC  -preffered access RRA versus RFA  -patient seen and examined  -confirmed appropriate NPO duration  -ECG and Labs reviewed  -Aspirin 81mg po pre-cath  -procedure discussed with patient; risks and benefits explained, questions answered  -consent obtained by attending IC  -0.9% NS 250cc bolus ordered to prevent EVELIA    ASA/Mallempati performed as above.  pt. assessed, appropriate for sedation, pt.  educated regarding the plan for Versed/fentanyl as needed    Risks, benefits, and alternatives reviewed.  Risks including but not limited to MI, death, stroke, bleeding, infection, vessel injury, hematoma, renal failure, allergic reaction, urgent open heart surgery, restenosis and stent thrombosis were reviewed.  All questions answered.  Patient is agreeable to proceed.   Pt. assessed, appropriate for sedation, pt. educated regarding the plan for Versed/Fentanyl as needed.           Impression:  74F PMH HTN; High Cholesterol; Hypothyroidism; Thyroidectomy; Hysterectomy; Lumbosacral spinal surgery, high total calcium score  264 (LM: 0, , Cx 75; RCA 0)  with complaints of CASSIDY and a heavy sensation on the left side of her when she tries to exert herself.+Abnormal SPECT Myocardial Perfusion Study with EKG changes revealed 1.5mm up sloping ST depression in inferolateral leads during stress only. Myocardial perfusion imaging revealed small to moderate sized, mild to moderate severity, inferior and inferolateral completely reversible defect suggestive of ischemia. She presents to [facility] for elective LHC today.     Risk Assessments:  ASA: 3  Mallampati: 2  GFR: 94  Cr: 0.63  BRA: 1.4%    Plan:  -plan for LHC  -preffered access RRA versus RFA  -patient seen and examined  -confirmed appropriate NPO duration  -ECG and Labs reviewed  -Aspirin 81mg po pre-cath  -procedure discussed with patient; risks and benefits explained, questions answered  -consent obtained by attending IC  -0.9% NS 250cc bolus ordered to prevent EVELIA    ASA/Mallempati performed as above.  pt. assessed, appropriate for sedation, pt.  educated regarding the plan for Versed/fentanyl as needed    Risks, benefits, and alternatives reviewed.  Risks including but not limited to MI, death, stroke, bleeding, infection, vessel injury, hematoma, renal failure, allergic reaction, urgent open heart surgery, restenosis and stent thrombosis were reviewed.  All questions answered.  Patient is agreeable to proceed.   Pt. assessed, appropriate for sedation, pt. educated regarding the plan for Versed/Fentanyl as needed.

## 2024-11-15 ENCOUNTER — OUTPATIENT (OUTPATIENT)
Dept: OUTPATIENT SERVICES | Facility: HOSPITAL | Age: 74
LOS: 1 days | End: 2024-11-15
Payer: MEDICARE

## 2024-11-15 ENCOUNTER — TRANSCRIPTION ENCOUNTER (OUTPATIENT)
Age: 74
End: 2024-11-15

## 2024-11-15 VITALS
SYSTOLIC BLOOD PRESSURE: 149 MMHG | TEMPERATURE: 98 F | RESPIRATION RATE: 16 BRPM | DIASTOLIC BLOOD PRESSURE: 73 MMHG | HEART RATE: 68 BPM | OXYGEN SATURATION: 100 %

## 2024-11-15 VITALS — HEART RATE: 76 BPM | DIASTOLIC BLOOD PRESSURE: 68 MMHG | RESPIRATION RATE: 17 BRPM | SYSTOLIC BLOOD PRESSURE: 126 MMHG

## 2024-11-15 DIAGNOSIS — Z98.890 OTHER SPECIFIED POSTPROCEDURAL STATES: Chronic | ICD-10-CM

## 2024-11-15 DIAGNOSIS — E89.0 POSTPROCEDURAL HYPOTHYROIDISM: Chronic | ICD-10-CM

## 2024-11-15 DIAGNOSIS — R94.39 ABNORMAL RESULT OF OTHER CARDIOVASCULAR FUNCTION STUDY: ICD-10-CM

## 2024-11-15 LAB
ABO RH CONFIRMATION: SIGNIFICANT CHANGE UP
ANION GAP SERPL CALC-SCNC: 15 MMOL/L — SIGNIFICANT CHANGE UP (ref 5–17)
BASOPHILS # BLD AUTO: 0.04 K/UL — SIGNIFICANT CHANGE UP (ref 0–0.2)
BASOPHILS NFR BLD AUTO: 0.6 % — SIGNIFICANT CHANGE UP (ref 0–2)
BLD GP AB SCN SERPL QL: SIGNIFICANT CHANGE UP
BUN SERPL-MCNC: 17.2 MG/DL — SIGNIFICANT CHANGE UP (ref 8–20)
CALCIUM SERPL-MCNC: 9.2 MG/DL — SIGNIFICANT CHANGE UP (ref 8.4–10.5)
CHLORIDE SERPL-SCNC: 103 MMOL/L — SIGNIFICANT CHANGE UP (ref 96–108)
CO2 SERPL-SCNC: 24 MMOL/L — SIGNIFICANT CHANGE UP (ref 22–29)
CREAT SERPL-MCNC: 0.63 MG/DL — SIGNIFICANT CHANGE UP (ref 0.5–1.3)
EGFR: 94 ML/MIN/1.73M2 — SIGNIFICANT CHANGE UP
EOSINOPHIL # BLD AUTO: 0.14 K/UL — SIGNIFICANT CHANGE UP (ref 0–0.5)
EOSINOPHIL NFR BLD AUTO: 2 % — SIGNIFICANT CHANGE UP (ref 0–6)
GLUCOSE SERPL-MCNC: 89 MG/DL — SIGNIFICANT CHANGE UP (ref 70–99)
HCT VFR BLD CALC: 43.7 % — SIGNIFICANT CHANGE UP (ref 34.5–45)
HGB BLD-MCNC: 14.1 G/DL — SIGNIFICANT CHANGE UP (ref 11.5–15.5)
IMM GRANULOCYTES NFR BLD AUTO: 0.1 % — SIGNIFICANT CHANGE UP (ref 0–0.9)
LYMPHOCYTES # BLD AUTO: 2.61 K/UL — SIGNIFICANT CHANGE UP (ref 1–3.3)
LYMPHOCYTES # BLD AUTO: 36.7 % — SIGNIFICANT CHANGE UP (ref 13–44)
MAGNESIUM SERPL-MCNC: 2 MG/DL — SIGNIFICANT CHANGE UP (ref 1.6–2.6)
MCHC RBC-ENTMCNC: 28.7 PG — SIGNIFICANT CHANGE UP (ref 27–34)
MCHC RBC-ENTMCNC: 32.3 G/DL — SIGNIFICANT CHANGE UP (ref 32–36)
MCV RBC AUTO: 89 FL — SIGNIFICANT CHANGE UP (ref 80–100)
MONOCYTES # BLD AUTO: 0.35 K/UL — SIGNIFICANT CHANGE UP (ref 0–0.9)
MONOCYTES NFR BLD AUTO: 4.9 % — SIGNIFICANT CHANGE UP (ref 2–14)
NEUTROPHILS # BLD AUTO: 3.96 K/UL — SIGNIFICANT CHANGE UP (ref 1.8–7.4)
NEUTROPHILS NFR BLD AUTO: 55.7 % — SIGNIFICANT CHANGE UP (ref 43–77)
PLATELET # BLD AUTO: 197 K/UL — SIGNIFICANT CHANGE UP (ref 150–400)
POTASSIUM SERPL-MCNC: 4.7 MMOL/L — SIGNIFICANT CHANGE UP (ref 3.5–5.3)
POTASSIUM SERPL-SCNC: 4.7 MMOL/L — SIGNIFICANT CHANGE UP (ref 3.5–5.3)
RBC # BLD: 4.91 M/UL — SIGNIFICANT CHANGE UP (ref 3.8–5.2)
RBC # FLD: 13.5 % — SIGNIFICANT CHANGE UP (ref 10.3–14.5)
SODIUM SERPL-SCNC: 142 MMOL/L — SIGNIFICANT CHANGE UP (ref 135–145)
WBC # BLD: 7.11 K/UL — SIGNIFICANT CHANGE UP (ref 3.8–10.5)
WBC # FLD AUTO: 7.11 K/UL — SIGNIFICANT CHANGE UP (ref 3.8–10.5)

## 2024-11-15 PROCEDURE — 85025 COMPLETE CBC W/AUTO DIFF WBC: CPT

## 2024-11-15 PROCEDURE — 86850 RBC ANTIBODY SCREEN: CPT

## 2024-11-15 PROCEDURE — C1894: CPT

## 2024-11-15 PROCEDURE — C1887: CPT

## 2024-11-15 PROCEDURE — 36415 COLL VENOUS BLD VENIPUNCTURE: CPT

## 2024-11-15 PROCEDURE — 93010 ELECTROCARDIOGRAM REPORT: CPT

## 2024-11-15 PROCEDURE — 93005 ELECTROCARDIOGRAM TRACING: CPT

## 2024-11-15 PROCEDURE — 80048 BASIC METABOLIC PNL TOTAL CA: CPT

## 2024-11-15 PROCEDURE — C1769: CPT

## 2024-11-15 PROCEDURE — 83735 ASSAY OF MAGNESIUM: CPT

## 2024-11-15 PROCEDURE — 86901 BLOOD TYPING SEROLOGIC RH(D): CPT

## 2024-11-15 PROCEDURE — 93458 L HRT ARTERY/VENTRICLE ANGIO: CPT

## 2024-11-15 PROCEDURE — 86900 BLOOD TYPING SEROLOGIC ABO: CPT

## 2024-11-15 RX ORDER — SODIUM CHLORIDE 9 MG/ML
250 INJECTION, SOLUTION INTRAMUSCULAR; INTRAVENOUS; SUBCUTANEOUS ONCE
Refills: 0 | Status: ACTIVE | OUTPATIENT
Start: 2024-11-15

## 2024-11-15 RX ORDER — ASPIRIN/MAG CARB/ALUMINUM AMIN 325 MG
0 TABLET ORAL
Refills: 0 | DISCHARGE

## 2024-11-15 RX ORDER — ASPIRIN/MAG CARB/ALUMINUM AMIN 325 MG
1 TABLET ORAL
Refills: 0 | DISCHARGE

## 2024-11-15 RX ORDER — LEVOTHYROXINE SODIUM 88 MCG
1 TABLET ORAL
Refills: 0 | DISCHARGE

## 2024-11-15 RX ORDER — CHLORHEXIDINE GLUCONATE 40 MG/ML
1 SOLUTION TOPICAL ONCE
Refills: 0 | Status: ACTIVE | OUTPATIENT
Start: 2024-11-15

## 2024-11-15 NOTE — DISCHARGE NOTE PROVIDER - HOSPITAL COURSE
74F PMH HTN; High Cholesterol; Hypothyroidism;  Thyroid Ca s/p Thyroidectomy (15 years ago); Uterine Ca s/p Hysterectomy (35 years ago); Spinal stenosis s/p Lumbosacral spinal surgery 2020) with complaints of CASSIDY and a heavy sensation on the left side of her when she tries to exert herself. CT calcium score performed 9/3/24 = 264 (LM: 0, , Cx 75; RCA 0). She underwent SPECT Myocardial Perfusion Study in which she exercised for 6 mins achieving 7 METs, chest pain/ dyspnea/ fatigue occurred EKG changes revealed 1.5mm up sloping ST depression (mildly positive) in inferolateral leads during stress only. Myocardial perfusion imaging revealed small to moderate sized, mild to moderate severity, inferior and inferolateral completely reversible defect suggestive of ischemia. EF 65%. Patient presents to [facility] for elective LHC due to abnormal NST and to further evaluate for coronary artery disease.     Now s/p LHC via RRA with Dr. Solorzano, pt tolerated procedure well. Pt arrived to recovery in NAD and HDS, RRA access site stable, no bleed/hematoma, distal pulse +2, RUE/ Right hand remains acyanotic; warm to touch; motor/sensory function intact.   Intraprocedural findings: < from: Cardiac Catheterization (11.15.24 @ 17:52) >  Left main artery: The segment is visually normal in size and structure.  Proximal left anterior descending: Angiography shows minor irregularities. Mid left anterior descending: Angiography shows mild atherosclerosis.    Circumflex: The segment is visually normal in size and structure.    Right coronary artery: Angiography shows minor irregularities.      < end of copied text >    Medications:  Lidocaine 1%: 5ml  Fentanyl: 50mcg iV  Versed: 1mg IV  Verapamil: 5mg IA  Heparin: 4,000 units IA  Omnipaque: 53ml  Closure Device: RRA site stable with band in place.     Plan:  -Formal cath report pending  -Post procedure management/monitoring per protocol  -Access site precautions  -Radial compression band removal at 1930 (730 pm ) if RRA site stable w/o active bleeding or hematoma  -Bedrest x 2 hours post procedure. Ok to ambulate at 830 pm if RRA site stable  -Labs and EKG in am  -NS 0.9% 250ml/hr x 1 bolus: post procedure EVELIA ppx   -Repeat ECG if any clinical indication or change on tele  -Continue current medical therapy  -Per Dr. Solorzano, he will start statin in office.   -Educated regarding post procedure management and care  -Discussed the importance of RF modification  -Cardiac rehab info provided/referral and communication to cardiac rehab completed  -F/U outpt in 1-2 weeks with Cardiologist Dr. Solorzano  -DISPO: Plan for D/C in am if remains HDS, ECG and labs in am stable and without complications

## 2024-11-15 NOTE — DISCHARGE NOTE NURSING/CASE MANAGEMENT/SOCIAL WORK - PATIENT PORTAL LINK FT
You can access the FollowMyHealth Patient Portal offered by [facility] by registering at the following website: [email or web address]. By joining [employer]’s FollowMyHealth portal, you will also be able to view your health information using other applications (apps) compatible with our system.

## 2024-11-15 NOTE — DISCHARGE NOTE PROVIDER - CARE PROVIDER_API CALL
Jorge Luis Solorzano  Interventional Cardiology  [address] [place] [address], [address]  [place] [address]  Phone: [phone]  Fax: [phone]  Established Patient  Follow Up Time: 1 week

## 2024-11-15 NOTE — DISCHARGE NOTE PROVIDER - NSDCMRMEDTOKEN_GEN_ALL_CORE_FT
aspirin 81 mg oral tablet: orally once a day  levothyroxine 100 mcg (0.1 mg) oral capsule: 1 cap(s) orally once a day

## 2024-11-15 NOTE — DISCHARGE NOTE NURSING/CASE MANAGEMENT/SOCIAL WORK - NSDCPEFALRISK_GEN_ALL_CORE
For information on Fall & Injury Prevention, visit: [email or web address] OR  [email or web address] OR  https://www.cdc.gov/steadi/patient.html

## 2024-11-15 NOTE — DISCHARGE NOTE PROVIDER - NSDCCPTREATMENT_GEN_ALL_CORE_FT
PRINCIPAL PROCEDURE  Procedure: Left heart cardiac cath  Findings and Treatment: -You had a cardiac catheterization with Dr. Solorzano today on 11/15/24. Dr. Solorzano accessed your right radial artery during the procedure. That is the artery in your right wrist.   -Please continue to monitor your right wrist when you go home. If you develop any bleeding, lay down where you are and apply direct firm pressure until the bleeding stops. If the bleeding is excessive, please call 911.   -If heavy bleeding or large lumps form, hold pressure at the spot and come to the Emergency Room.  -No submerging the arm in water for 48 hours. This includes hot tubs, swimming pools and jacuzzis.  You may start showering tomorrow on 11/16. Prior to showering, remove dressing from right wrist. Shower with warm water and soap. Pat dry with towel. You may place a bandaid over the site. change the bandaid every day.   -Restricted use with no heavy lifting of affected arm for 5 days. No heavy lifting greater than 5 lbs.  -You may walk indoors/ outdoors as tolerated. No strenuous exercise, gym, sports or heavy lifting x5 days.  -Please return to nearest ED if you develop any fever, chills, drainage from the incision site, or any change of temperature, color, sensation of the affected extremity.  -Call your doctor for any bleeding, swelling, loss of sensation in the hand or fingers, or fingers turning blue.  -Please return to nearest ED if you develop any chest pain, chest pressure, shortness of breath, jaw pain, pain radiating down the arm, palpitations, dizziness, palpitations, abdominal complaints including abdominal pain, nausea and vomiting.   -Please follow up with your cardiologist in 1-2 weeks

## 2024-11-15 NOTE — DISCHARGE NOTE PROVIDER - NSDCCPCAREPLAN_GEN_ALL_CORE_FT
PRINCIPAL DISCHARGE DIAGNOSIS  Diagnosis: Mild CAD  Assessment and Plan of Treatment: -Please continue to take all of your medications as previously prescribed.  -Please follow up with Dr. Solorzano in 1-2 weeks.   -Please return to nearest ER if you develop any chest pain, chest pressure, shortness of breath, palpitations, dizziness or leg swelling.   -Please continue a diet that is low in cholesterol and low in salt.   DASH Diet (to lower high blood pressure):  - Try to eat foods rich in potassium, calcium, magnesium, fiber, and protein  - Limit salt (sodium) intake to less than 1,500 mg per day  - Eat vegetables, fruits, and whole grains  - Include fat-free or low-fat dairy products, fish, poultry, beans, nuts, and vegetable oils  - Limit foods that are high in saturated or trans fat, such as fatty meats, full-fat dairy products, and tropical oils such as coconut, palm kernel, and palm oils  - Limit sugar-sweetened beverages and sweets  For more information: https://www.nhlbi.nih.gov/education/dash-eating-plan

## 2024-11-15 NOTE — DISCHARGE NOTE NURSING/CASE MANAGEMENT/SOCIAL WORK - FINANCIAL ASSISTANCE
[facility] provides services at a reduced cost to those who are determined to be eligible through [facility]’s financial assistance program. Information regarding [facility]’s financial assistance program can be found by going to [email or web address] or by calling [phone].

## 2025-02-24 ENCOUNTER — APPOINTMENT (OUTPATIENT)
Dept: ORTHOPEDIC SURGERY | Facility: CLINIC | Age: 75
End: 2025-02-24

## 2025-03-12 ENCOUNTER — OFFICE (OUTPATIENT)
Dept: URBAN - METROPOLITAN AREA CLINIC 105 | Facility: CLINIC | Age: 75
Setting detail: OPHTHALMOLOGY
End: 2025-03-12
Payer: MEDICARE

## 2025-03-12 DIAGNOSIS — H16.221: ICD-10-CM

## 2025-03-12 DIAGNOSIS — H16.222: ICD-10-CM

## 2025-03-12 DIAGNOSIS — H16.223: ICD-10-CM

## 2025-03-12 PROBLEM — H35.033 HYPERTENSIVE RETINOPATHY; BOTH EYES: Status: ACTIVE | Noted: 2025-03-12

## 2025-03-12 PROCEDURE — 83861 MICROFLUID ANALY TEARS: CPT | Mod: LT | Performed by: OPHTHALMOLOGY

## 2025-03-12 PROCEDURE — 92012 INTRM OPH EXAM EST PATIENT: CPT | Performed by: OPHTHALMOLOGY

## 2025-03-12 PROCEDURE — 83861 MICROFLUID ANALY TEARS: CPT | Mod: QW | Performed by: OPHTHALMOLOGY

## 2025-03-12 ASSESSMENT — TEAR BREAK UP TIME (TBUT)
OD_TBUT: 2+
OS_TBUT: 1+

## 2025-03-12 ASSESSMENT — REFRACTION_CURRENTRX
OD_ADD: +2.50
OD_AXIS: 119
OD_VPRISM_DIRECTION: PROGS
OS_VPRISM_DIRECTION: PROGS
OS_AXIS: 067
OS_CYLINDER: -0.50
OS_OVR_VA: 20/
OD_OVR_VA: 20/
OD_SPHERE: PLANO
OD_CYLINDER: -0.25
OS_SPHERE: -0.25
OS_ADD: +2.50

## 2025-03-12 ASSESSMENT — VISUAL ACUITY
OD_BCVA: 20/30-2
OS_BCVA: 20/30-1

## 2025-03-12 ASSESSMENT — SUPERFICIAL PUNCTATE KERATITIS (SPK)
OS_SPK: 1+ 2+
OD_SPK: 1+ 2+

## 2025-03-12 ASSESSMENT — CONFRONTATIONAL VISUAL FIELD TEST (CVF)
OD_FINDINGS: FULL
OS_FINDINGS: FULL

## 2025-03-12 ASSESSMENT — KERATOMETRY
OD_K2POWER_DIOPTERS: 39.75
OD_K1POWER_DIOPTERS: 39.50
OS_K1POWER_DIOPTERS: 39.75
OD_AXISANGLE_DEGREES: 053
OS_K2POWER_DIOPTERS: 39.75
OS_AXISANGLE_DEGREES: 090

## 2025-03-12 ASSESSMENT — REFRACTION_AUTOREFRACTION
OD_SPHERE: -0.25
OD_CYLINDER: -1.50
OS_CYLINDER: -1.25
OS_SPHERE: PLANO
OD_AXIS: 111
OS_AXIS: 086

## 2025-03-12 ASSESSMENT — LID EXAM ASSESSMENTS
OS_BLEPHARITIS: LLL LUL
OD_BLEPHARITIS: RLL RUL

## 2025-03-12 ASSESSMENT — TONOMETRY
OS_IOP_MMHG: 19
OD_IOP_MMHG: 18

## 2025-04-08 ENCOUNTER — NON-APPOINTMENT (OUTPATIENT)
Age: 75
End: 2025-04-08

## 2025-04-29 ENCOUNTER — APPOINTMENT (OUTPATIENT)
Dept: ORTHOPEDIC SURGERY | Facility: CLINIC | Age: 75
End: 2025-04-29

## 2025-04-29 DIAGNOSIS — M17.11 UNILATERAL PRIMARY OSTEOARTHRITIS, RIGHT KNEE: ICD-10-CM

## 2025-04-29 DIAGNOSIS — M17.12 UNILATERAL PRIMARY OSTEOARTHRITIS, LEFT KNEE: ICD-10-CM

## 2025-04-29 PROCEDURE — 20611 DRAIN/INJ JOINT/BURSA W/US: CPT | Mod: LT

## 2025-04-29 PROCEDURE — J3490M: CUSTOM

## 2025-04-29 PROCEDURE — 99213 OFFICE O/P EST LOW 20 MIN: CPT | Mod: 25

## 2025-04-29 RX ORDER — LEVOTHYROXINE SODIUM 200 UG/1
CAPSULE ORAL
Refills: 0 | Status: ACTIVE | COMMUNITY

## 2025-04-29 RX ORDER — FAMOTIDINE 10 MG/1
TABLET, FILM COATED ORAL
Refills: 0 | Status: ACTIVE | COMMUNITY

## 2025-05-13 ENCOUNTER — APPOINTMENT (OUTPATIENT)
Dept: ORTHOPEDIC SURGERY | Facility: CLINIC | Age: 75
End: 2025-05-13

## 2025-05-13 VITALS — HEIGHT: 65 IN | BODY MASS INDEX: 30.32 KG/M2 | WEIGHT: 182 LBS

## 2025-05-13 PROCEDURE — 20611 DRAIN/INJ JOINT/BURSA W/US: CPT | Mod: LT

## 2025-05-15 ENCOUNTER — APPOINTMENT (OUTPATIENT)
Dept: PHYSICAL MEDICINE AND REHAB | Facility: CLINIC | Age: 75
End: 2025-05-15
Payer: MEDICARE

## 2025-05-15 VITALS — WEIGHT: 182 LBS | HEIGHT: 65 IN | BODY MASS INDEX: 30.32 KG/M2

## 2025-05-15 PROCEDURE — 73564 X-RAY EXAM KNEE 4 OR MORE: CPT | Mod: LT

## 2025-05-15 PROCEDURE — 99215 OFFICE O/P EST HI 40 MIN: CPT

## 2025-05-15 RX ORDER — METHYLPREDNISOLONE 4 MG/1
4 TABLET ORAL
Qty: 1 | Refills: 0 | Status: ACTIVE | COMMUNITY
Start: 2025-05-15 | End: 1900-01-01

## 2025-05-16 ENCOUNTER — APPOINTMENT (OUTPATIENT)
Dept: ORTHOPEDIC SURGERY | Facility: CLINIC | Age: 75
End: 2025-05-16

## 2025-05-20 ENCOUNTER — APPOINTMENT (OUTPATIENT)
Dept: ORTHOPEDIC SURGERY | Facility: CLINIC | Age: 75
End: 2025-05-20
Payer: MEDICARE

## 2025-05-20 VITALS — BODY MASS INDEX: 30.32 KG/M2 | HEIGHT: 65 IN | WEIGHT: 182 LBS

## 2025-05-20 VITALS — HEIGHT: 65 IN | BODY MASS INDEX: 30.32 KG/M2 | WEIGHT: 182 LBS

## 2025-05-20 DIAGNOSIS — M17.12 UNILATERAL PRIMARY OSTEOARTHRITIS, LEFT KNEE: ICD-10-CM

## 2025-05-20 DIAGNOSIS — M17.11 UNILATERAL PRIMARY OSTEOARTHRITIS, RIGHT KNEE: ICD-10-CM

## 2025-05-20 PROCEDURE — 99213 OFFICE O/P EST LOW 20 MIN: CPT

## 2025-05-20 RX ORDER — METHYLPREDNISOLONE 4 MG/1
4 TABLET ORAL
Qty: 1 | Refills: 0 | Status: ACTIVE | COMMUNITY
Start: 2025-05-20 | End: 1900-01-01

## 2025-06-02 ENCOUNTER — RX ONLY (RX ONLY)
Age: 75
End: 2025-06-02

## 2025-06-02 ENCOUNTER — OFFICE (OUTPATIENT)
Dept: URBAN - METROPOLITAN AREA CLINIC 105 | Facility: CLINIC | Age: 75
Setting detail: OPHTHALMOLOGY
End: 2025-06-02
Payer: MEDICARE

## 2025-06-02 DIAGNOSIS — H16.223: ICD-10-CM

## 2025-06-02 PROBLEM — H16.222 DRY EYE SYNDROME K SICCA; RIGHT EYE, LEFT EYE, BOTH EYES: Status: ACTIVE | Noted: 2025-06-02

## 2025-06-02 PROBLEM — H16.221 DRY EYE SYNDROME K SICCA; RIGHT EYE, LEFT EYE, BOTH EYES: Status: ACTIVE | Noted: 2025-06-02

## 2025-06-02 PROCEDURE — 83861 MICROFLUID ANALY TEARS: CPT | Mod: QW | Performed by: OPHTHALMOLOGY

## 2025-06-02 PROCEDURE — 92012 INTRM OPH EXAM EST PATIENT: CPT | Performed by: OPHTHALMOLOGY

## 2025-06-02 ASSESSMENT — REFRACTION_CURRENTRX
OD_SPHERE: PLANO
OS_SPHERE: -0.25
OD_AXIS: 119
OD_ADD: +2.50
OD_VPRISM_DIRECTION: PROGS
OD_OVR_VA: 20/
OS_CYLINDER: -0.50
OS_AXIS: 067
OS_VPRISM_DIRECTION: PROGS
OS_ADD: +2.50
OD_CYLINDER: -0.25
OS_OVR_VA: 20/

## 2025-06-02 ASSESSMENT — SUPERFICIAL PUNCTATE KERATITIS (SPK)
OD_SPK: ABSENT
OS_SPK: ABSENT

## 2025-06-02 ASSESSMENT — KERATOMETRY
OS_K2POWER_DIOPTERS: 39.75
OD_AXISANGLE_DEGREES: 014
OS_K1POWER_DIOPTERS: 39.25
OS_AXISANGLE_DEGREES: 166
OD_K2POWER_DIOPTERS: 39.50
OD_K1POWER_DIOPTERS: 39.00

## 2025-06-02 ASSESSMENT — REFRACTION_MANIFEST
OS_AXIS: 090
OD_SPHERE: +0.25
OS_VA1: 20/30-
OS_SPHERE: PLANO
OD_AXIS: 090
OS_ADD: +2.50
OD_VA1: 20/25
OD_CYLINDER: -0.75
OS_CYLINDER: -0.70
OD_ADD: +2.50

## 2025-06-02 ASSESSMENT — REFRACTION_AUTOREFRACTION
OS_AXIS: 087
OS_SPHERE: PLANO
OS_CYLINDER: -1.25
OD_SPHERE: PLANO
OD_AXIS: 102
OD_CYLINDER: -1.00

## 2025-06-02 ASSESSMENT — TONOMETRY
OS_IOP_MMHG: 17
OD_IOP_MMHG: 17

## 2025-06-02 ASSESSMENT — TEAR BREAK UP TIME (TBUT)
OD_TBUT: 2+
OS_TBUT: 2+

## 2025-06-02 ASSESSMENT — VISUAL ACUITY
OD_BCVA: 20/30-2
OS_BCVA: 20/25

## 2025-06-02 ASSESSMENT — CONFRONTATIONAL VISUAL FIELD TEST (CVF)
OS_FINDINGS: FULL
OD_FINDINGS: FULL

## 2025-06-02 ASSESSMENT — LID EXAM ASSESSMENTS
OD_BLEPHARITIS: RLL RUL
OS_BLEPHARITIS: LLL LUL

## 2025-07-01 ENCOUNTER — APPOINTMENT (OUTPATIENT)
Dept: ORTHOPEDIC SURGERY | Facility: CLINIC | Age: 75
End: 2025-07-01

## 2025-08-08 ENCOUNTER — APPOINTMENT (OUTPATIENT)
Dept: PULMONOLOGY | Facility: CLINIC | Age: 75
End: 2025-08-08
Payer: MEDICARE

## 2025-08-08 PROBLEM — Z00.00 ENCOUNTER FOR PREVENTIVE HEALTH EXAMINATION: Status: ACTIVE | Noted: 2025-08-08

## 2025-08-08 PROCEDURE — 94060 EVALUATION OF WHEEZING: CPT | Mod: TC

## 2025-08-08 PROCEDURE — 94729 DIFFUSING CAPACITY: CPT | Mod: TC

## 2025-08-08 PROCEDURE — 94727 GAS DIL/WSHOT DETER LNG VOL: CPT | Mod: TC

## 2025-08-08 RX ORDER — ALBUTEROL SULFATE 2.5 MG/3ML
(2.5 MG/3ML) SOLUTION RESPIRATORY (INHALATION)
Qty: 0 | Refills: 0 | Status: COMPLETED | OUTPATIENT
Start: 2025-08-08

## 2025-08-08 RX ADMIN — Medication 0 0.083%: at 00:00
